# Patient Record
Sex: MALE | Race: WHITE | NOT HISPANIC OR LATINO | URBAN - NONMETROPOLITAN AREA
[De-identification: names, ages, dates, MRNs, and addresses within clinical notes are randomized per-mention and may not be internally consistent; named-entity substitution may affect disease eponyms.]

---

## 2018-10-01 NOTE — PATIENT DISCUSSION
Moderate Vitreous opacities are impairing the vision. Patient will have a visual field done 24-2. Will have patient follow up in 2 months for possible PPV.

## 2019-04-05 NOTE — PATIENT DISCUSSION
Recommended vitrectomy surgery to remove vitreous opacities after discussing risks/benefits/alternatives including loss of vision, blindness and death. Patient elects to having surgery at this time.

## 2019-04-05 NOTE — PATIENT DISCUSSION
Moderate vit opacities.  Vitreous opacities are impairing the vision, effecting daily living activities including impairing driving.

## 2019-04-05 NOTE — PATIENT DISCUSSION
Patient wishes to have surgery in June due to travel plans prior.  Surgery scheduled 6/06/19, H & P 5/02/19 @ 3:00 , 1 day post op 6/07/19 @ 7:30.

## 2019-04-05 NOTE — PATIENT DISCUSSION
Vitreous opacities are impairing the vision.  Patient wishes to hold on OS at this time, elects to having OD first.  Will re evaluate after OD heals.

## 2020-11-04 ENCOUNTER — IMPORTED ENCOUNTER (OUTPATIENT)
Dept: URBAN - NONMETROPOLITAN AREA CLINIC 1 | Facility: CLINIC | Age: 35
End: 2020-11-04

## 2020-11-04 PROCEDURE — S0620 ROUTINE OPHTHALMOLOGICAL EXA: HCPCS

## 2020-11-04 NOTE — PATIENT DISCUSSION
Myopia OU - discussed refractive status in detail w/ patient - MR and new Rx given today - patient interested in LASIK surgery - RTC 1 year for Complete or PRN for LASIK evaluation

## 2021-02-12 PROBLEM — H52.13: Noted: 2021-02-12

## 2022-04-09 ASSESSMENT — TONOMETRY
OD_IOP_MMHG: 17
OS_IOP_MMHG: 14

## 2022-04-09 ASSESSMENT — VISUAL ACUITY
OS_SC: 20/20
OD_SC: 20/30

## 2025-01-24 NOTE — PATIENT DISCUSSION
12/07/18-Retina 3 month follow up.
[place] grid at home. MVI/AREDS discussed. Patient to call if any changes in vision or grid card.
Avoid ocular irritants which include smoke, paint fumes, moving air, etc.
Glasses Rx given.
Likely secondary to Vitreous opacities, See #1.
Membrane is not visually significant.  No evidence of progression.
Moderate vit opacities.  Vitreous opacities are impairing the vision, effecting daily living activities.
Monitor.
No retinal detachment or retinal tear noted.
Observe.
Patient elects observation.
Patient given Rx for glasses.
Patient is doing well following YAG capsulotomy. Signs and symptoms of retinal detachment including flashing and floaters were discussed. Patient was asked to schedule yearly preventative follow-up eye exams with us.
Patient understands condition, prognosis and need for follow up care.
Recommended OBSERVATION.
Recommended vitrectomy surgery to remove vitreous opacities after discussing risks/benefits/alternatives. Patient elects to defer surgery at this time.
Retinal detachment warnings given.
Use artificial tears to affected eye(s) as needed. QID, recommend Artificial Tears or Refresh gtts.
Ectopic pregnancy